# Patient Record
Sex: FEMALE | URBAN - METROPOLITAN AREA
[De-identification: names, ages, dates, MRNs, and addresses within clinical notes are randomized per-mention and may not be internally consistent; named-entity substitution may affect disease eponyms.]

---

## 2024-01-16 ENCOUNTER — OFFICE VISIT (OUTPATIENT)
Dept: URBAN - METROPOLITAN AREA CLINIC 115 | Facility: CLINIC | Age: 64
End: 2024-01-16
Payer: COMMERCIAL

## 2024-01-16 VITALS
HEART RATE: 81 BPM | WEIGHT: 145 LBS | DIASTOLIC BLOOD PRESSURE: 74 MMHG | SYSTOLIC BLOOD PRESSURE: 118 MMHG | TEMPERATURE: 97.8 F | HEIGHT: 61 IN | BODY MASS INDEX: 27.38 KG/M2

## 2024-01-16 DIAGNOSIS — R13.19 ESOPHAGEAL DYSPHAGIA: ICD-10-CM

## 2024-01-16 DIAGNOSIS — K59.01 SLOW TRANSIT CONSTIPATION: ICD-10-CM

## 2024-01-16 DIAGNOSIS — R09.A2 GLOBUS SENSATION: ICD-10-CM

## 2024-01-16 DIAGNOSIS — Z12.11 ENCOUNTER FOR SCREENING FOR MALIGNANT NEOPLASM OF COLON: ICD-10-CM

## 2024-01-16 PROBLEM — 35298007: Status: ACTIVE | Noted: 2024-01-16

## 2024-01-16 PROCEDURE — 99203 OFFICE O/P NEW LOW 30 MIN: CPT | Performed by: STUDENT IN AN ORGANIZED HEALTH CARE EDUCATION/TRAINING PROGRAM

## 2024-01-16 RX ORDER — TRAZODONE HYDROCHLORIDE 50 MG/1
TABLET ORAL
Qty: 30 TABLET | Refills: 0 | Status: ACTIVE | COMMUNITY

## 2024-01-16 NOTE — HPI-TODAY'S VISIT:
This is a 63-year-old woman with a past medical history significant only for insomnia presenting today for throat issues. Patient reports that she was in her usual state of health until mid December when she began to have sensation of something caught in her throat.  The patient reports that the sensation feels as if she has swallowed something that will not pass.  She reports that the symptoms are improved by drinking or eating somewhat however at times she feels like things are stuck in her throat or in her epigastrium while eating.  She reports that at no point has she had to regurgitate food and she reports that she has no nausea. The patient reports that she was seen at an urgent care where she was prescribed a proton pump inhibitor which she has been taking for 2 weeks.  She reports that in general this has done very little to improve her symptoms and she has subsequently discontinued the medication. She was tested for H. pylori by her primary care physician and review of her labs indicate that this was negative. In addition to the above symptoms the patient also reports that she has chronic constipation.  The patient reports that she uses the bathroom every 3 days and that she has had to take medications for constipation for years since the time of her  section.  She reports that she takes teas and pills which are designed for women. We discussed the risk of pelvic organ prolapse with chronic constipation and the patient reports that she does have a family history of that in her sister. The patient has never had a colonoscopy or screening for colorectal cancer and would also like to have this done

## 2024-01-25 ENCOUNTER — OFFICE VISIT (OUTPATIENT)
Dept: URBAN - METROPOLITAN AREA SURGERY CENTER 13 | Facility: SURGERY CENTER | Age: 64
End: 2024-01-25
Payer: COMMERCIAL

## 2024-01-25 ENCOUNTER — CLAIMS CREATED FROM THE CLAIM WINDOW (OUTPATIENT)
Dept: URBAN - METROPOLITAN AREA CLINIC 4 | Facility: CLINIC | Age: 64
End: 2024-01-25
Payer: COMMERCIAL

## 2024-01-25 DIAGNOSIS — K31.A11 INTESTINAL METAPLASIA OF ANTRUM OF STOMACH WITHOUT DYSPLASIA: ICD-10-CM

## 2024-01-25 DIAGNOSIS — Z12.11 ENCOUNTER FOR SCREENING FOR MALIGNANT NEOPLASM OF COLON: ICD-10-CM

## 2024-01-25 DIAGNOSIS — K31.89 OTHER DISEASES OF STOMACH AND DUODENUM: ICD-10-CM

## 2024-01-25 DIAGNOSIS — K29.60 OTHER GASTRITIS WITHOUT BLEEDING: ICD-10-CM

## 2024-01-25 DIAGNOSIS — K21.9 GASTROESOPHAGEAL REFLUX DISEASE: ICD-10-CM

## 2024-01-25 DIAGNOSIS — B96.81 HELICOBACTER PYLORI [H. PYLORI] AS THE CAUSE OF DISEASES CLASSIFIED ELSEWHERE: ICD-10-CM

## 2024-01-25 DIAGNOSIS — B96.81 BACTERIAL INFECTION DUE TO H. PYLORI: ICD-10-CM

## 2024-01-25 DIAGNOSIS — D12.3 ADENOMA OF TRANSVERSE COLON: ICD-10-CM

## 2024-01-25 DIAGNOSIS — R13.19 DYSPHAGIA: ICD-10-CM

## 2024-01-25 DIAGNOSIS — B96.81 H PYLORI: ICD-10-CM

## 2024-01-25 DIAGNOSIS — K29.70 CHRONIC GASTRITIS: ICD-10-CM

## 2024-01-25 DIAGNOSIS — D12.3 ADENOMATOUS POLYP OF TRANSVERSE COLON: ICD-10-CM

## 2024-01-25 PROCEDURE — 45385 COLONOSCOPY W/LESION REMOVAL: CPT | Performed by: STUDENT IN AN ORGANIZED HEALTH CARE EDUCATION/TRAINING PROGRAM

## 2024-01-25 PROCEDURE — 88312 SPECIAL STAINS GROUP 1: CPT | Performed by: PATHOLOGY

## 2024-01-25 PROCEDURE — 43239 EGD BIOPSY SINGLE/MULTIPLE: CPT | Performed by: STUDENT IN AN ORGANIZED HEALTH CARE EDUCATION/TRAINING PROGRAM

## 2024-01-25 PROCEDURE — 88305 TISSUE EXAM BY PATHOLOGIST: CPT | Performed by: PATHOLOGY

## 2024-01-25 PROCEDURE — 00813 ANES UPR LWR GI NDSC PX: CPT | Performed by: ANESTHESIOLOGIST ASSISTANT

## 2024-01-25 PROCEDURE — 00813 ANES UPR LWR GI NDSC PX: CPT | Performed by: ANESTHESIOLOGY

## 2024-01-25 PROCEDURE — G8907 PT DOC NO EVENTS ON DISCHARG: HCPCS | Performed by: STUDENT IN AN ORGANIZED HEALTH CARE EDUCATION/TRAINING PROGRAM

## 2024-02-13 ENCOUNTER — OV EP (OUTPATIENT)
Dept: URBAN - METROPOLITAN AREA CLINIC 115 | Facility: CLINIC | Age: 64
End: 2024-02-13

## 2024-02-28 ENCOUNTER — OV EP (OUTPATIENT)
Dept: URBAN - METROPOLITAN AREA CLINIC 115 | Facility: CLINIC | Age: 64
End: 2024-02-28
Payer: COMMERCIAL

## 2024-02-28 VITALS
SYSTOLIC BLOOD PRESSURE: 144 MMHG | TEMPERATURE: 96.6 F | WEIGHT: 148.6 LBS | BODY MASS INDEX: 28.05 KG/M2 | DIASTOLIC BLOOD PRESSURE: 87 MMHG | HEIGHT: 61 IN | HEART RATE: 82 BPM

## 2024-02-28 DIAGNOSIS — A04.8 BACTERIAL INFECTION DUE TO H. PYLORI: ICD-10-CM

## 2024-02-28 DIAGNOSIS — K21.9 ACID REFLUX: ICD-10-CM

## 2024-02-28 PROBLEM — 266435005: Status: ACTIVE | Noted: 2024-02-28

## 2024-02-28 PROBLEM — 721730009: Status: ACTIVE | Noted: 2024-02-28

## 2024-02-28 PROCEDURE — 99213 OFFICE O/P EST LOW 20 MIN: CPT

## 2024-02-28 RX ORDER — OMEPRAZOLE 40 MG/1
TWICE DAILY 30 MINUTES BEFORE BREAKFAST AND DINNER CAPSULE, DELAYED RELEASE ORAL ONCE A DAY
Qty: 28 | Refills: 0 | Status: ON HOLD | COMMUNITY
Start: 2024-02-02

## 2024-02-28 RX ORDER — OMEPRAZOLE 40 MG/1
1 CAPSULE 30 MINUTES BEFORE MORNING MEAL CAPSULE, DELAYED RELEASE ORAL ONCE A DAY
Qty: 30 | Refills: 1
Start: 2024-02-02

## 2024-02-28 RX ORDER — TRAZODONE HYDROCHLORIDE 50 MG/1
TABLET ORAL
Qty: 30 TABLET | Refills: 0 | Status: ON HOLD | COMMUNITY

## 2024-02-28 NOTE — HPI-TODAY'S VISIT:
24 with Dr. Agustin: This is a 63-year-old woman with a past medical history significant only for insomnia presenting today for throat issues. Patient reports that she was in her usual state of health until mid December when she began to have sensation of something caught in her throat.  The patient reports that the sensation feels as if she has swallowed something that will not pass.  She reports that the symptoms are improved by drinking or eating somewhat however at times she feels like things are stuck in her throat or in her epigastrium while eating.  She reports that at no point has she had to regurgitate food and she reports that she has no nausea. The patient reports that she was seen at an urgent care where she was prescribed a proton pump inhibitor which she has been taking for 2 weeks.  She reports that in general this has done very little to improve her symptoms and she has subsequently discontinued the medication. She was tested for H. pylori by her primary care physician and review of her labs indicate that this was negative. In addition to the above symptoms the patient also reports that she has chronic constipation.  The patient reports that she uses the bathroom every 3 days and that she has had to take medications for constipation for years since the time of her  section.  She reports that she takes teas and pills which are designed for women. We discussed the risk of pelvic organ prolapse with chronic constipation and the patient reports that she does have a family history of that in her sister. The patient has never had a colonoscopy or screening for colorectal cancer and would also like to have this done 24 today visit: Pt presents for f/u. Pt was recommended continue PPI, EGD to r/o EoE, colonoscopy to r/o obstruction for constipation and miralax qd (titrate up to TID); if colonoscopy normal, anorectal manometry. 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 Pt was tx'd with quad therapy for H. pylori and was supposed to f/u for test of eradication. Reports excessive burping, gurgling (worse than before), nausea (worse than before), epigastric abdominal pain worse in afternoon. Finished abx 1 week ago. As for her constipation, she is improved with Miralax (having BMs every day).

## 2024-03-13 ENCOUNTER — LAB (OUTPATIENT)
Dept: URBAN - METROPOLITAN AREA CLINIC 115 | Facility: CLINIC | Age: 64
End: 2024-03-13

## 2024-03-28 ENCOUNTER — OV EP (OUTPATIENT)
Dept: URBAN - METROPOLITAN AREA CLINIC 115 | Facility: CLINIC | Age: 64
End: 2024-03-28

## 2024-03-28 NOTE — HPI-TODAY'S VISIT:
24 with Dr. Agustin: This is a 63-year-old woman with a past medical history significant only for insomnia presenting today for throat issues. Patient reports that she was in her usual state of health until mid December when she began to have sensation of something caught in her throat.  The patient reports that the sensation feels as if she has swallowed something that will not pass.  She reports that the symptoms are improved by drinking or eating somewhat however at times she feels like things are stuck in her throat or in her epigastrium while eating.  She reports that at no point has she had to regurgitate food and she reports that she has no nausea. The patient reports that she was seen at an urgent care where she was prescribed a proton pump inhibitor which she has been taking for 2 weeks.  She reports that in general this has done very little to improve her symptoms and she has subsequently discontinued the medication. She was tested for H. pylori by her primary care physician and review of her labs indicate that this was negative. In addition to the above symptoms the patient also reports that she has chronic constipation.  The patient reports that she uses the bathroom every 3 days and that she has had to take medications for constipation for years since the time of her  section.  She reports that she takes teas and pills which are designed for women. We discussed the risk of pelvic organ prolapse with chronic constipation and the patient reports that she does have a family history of that in her sister. The patient has never had a colonoscopy or screening for colorectal cancer and would also like to have this done 24: Pt presents for f/u. Pt was recommended continue PPI, EGD to r/o EoE, colonoscopy to r/o obstruction for constipation and miralax qd (titrate up to TID); if colonoscopy normal, anorectal manometry. 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 Pt was tx'd with quad therapy for H. pylori and was supposed to f/u for test of eradication. Reports excessive burping, gurgling (worse than before), nausea (worse than before), epigastric abdominal pain worse in afternoon. Finished abx 1 week ago. As for her constipation, she is improved with Miralax (having BMs every day). 3/28/24 today visit: Pt presents for f/u. Did not get HBT done yet. Antireflux diet changes include . Burping, gurgling, nausea, epigastric pain is .